# Patient Record
(demographics unavailable — no encounter records)

---

## 2024-11-06 NOTE — HISTORY OF PRESENT ILLNESS
[FreeTextEntry1] : S/P L Mx/SLN/TRAM (02)([place]): +DCIS, -0/6 LN L Stage 0 DCIS +FH Br Ca (Sister 52) BRCA (): - S/P Bilat Ax Sono Core Bx's (10/20/15): "Benign" S/P L Br Bx (10/23/02)([place]): +DCIS Started "Bioidentical" HRT creams (): Feeling better.  S/P Cardiac Ablation () for SVT > palp's recurred > on metoprolol > helping Son  after fall Mother  (80yo) > Lung Ca Father  (86yo) > med probs Had extensive w/u for RLQ abd pain > "-" Pt dx'ed w. elevated Hg/Arsenic/selenium > ? s/t old fillings > seeing Dentist > discussed.  to get tested also S/P Mho's for SCC sup pre-sternal and L Post Cx Colonoscopy (): "WNL" > 5yrs  PAP/Pelvic (): "WNL"  Had COVID () > recovered Got second Pfizer ()(RUE) No other MH/FH changes. ROS reviewed/discussed.  R Mammo/Sono (23): FG, +stable L 1 cm Ax LN, NSF

## 2024-11-06 NOTE — PHYSICAL EXAM
[Normocephalic] : normocephalic [Atraumatic] : atraumatic [Supple] : supple [No Supraclavicular Adenopathy] : no supraclavicular adenopathy [No Cervical Adenopathy] : no cervical adenopathy [No Thyromegaly] : no thyromegaly [Normal Sinus Rhythm] : normal sinus rhythm [Examined in the supine and seated position] : examined in the supine and seated position [No dominant masses] : no dominant masses in right breast  [No dominant masses] : no dominant masses left breast [No Nipple Retraction] : no left nipple retraction [No Nipple Discharge] : no left nipple discharge [No Axillary Lymphadenopathy] : no left axillary lymphadenopathy [No Edema] : no edema [No Rashes] : no rashes [No Ulceration] : no ulceration [de-identified] : +FC tissue NSF  [de-identified] : S/P SSMX/SLN/TRAM w/o rec. %. No lymphedema.

## 2025-06-12 NOTE — PHYSICAL EXAM
[NI] : Normal [de-identified] : shoulder  r side with scr and track suture marks with central 3 mm bump red and irritated  [de-identified] : as above

## 2025-06-12 NOTE — HISTORY OF PRESENT ILLNESS
[FreeTextEntry1] : AMALIA had scca removed from r shoulder and has a new lesion in the center of the scar AMALIA  has had uncomplicated recovery from surgery and anesthesia AMALIA was ssen this am by derm who was purportedly going to schedule moh's excision but decided to freeze the lesion with no biopsy done.  AMALIA is confused as to what to do The risks, benefits, alternatives, limitations and the permanent scars were outlined with the patient. Discussion of risks included but not limited to bleeding, infection, delayed healing, and anesthetic risk. AMALIA will allow healing of the site for a month and re evaluated the site  and we will consider excision if there is any residual lesion present 
Yes

## 2025-06-12 NOTE — ASSESSMENT
[FreeTextEntry1] : AMALIA  is going to leave the site for 4-8 weeks and then decide if she wants an excisional biopsy of the lesion All of AMALIA 's questions and concerns were addressed and answered completely  .brody is also interested in eyelid surgery facial surgery and toning of the arms with morpharnavs 8  we discussed 3 sessions of lisandra 8 to start

## 2025-06-18 NOTE — ASSESSMENT
[FreeTextEntry1] : AMALIA is prepared for bilateral arm treatment x 3 sessions with lisandra 8 with topical and injection anesthetic

## 2025-06-18 NOTE — HISTORY OF PRESENT ILLNESS
[FreeTextEntry1] : AMALIA is complaining of bilateral arm laxity and crepe skin AMALIA desires brachioplasty surgery The risks, benefits, alternatives, limitations and the permanent scars were outlined with the patient. Discussion of risks included but not limited to bleeding, infection, delayed healing, and anesthetic risk. All of AMALIA 's questions and concerns were addressed and answered completely  The instructions were reviewed in detail with AMALIA.  AMALIA has mild crepe skin with minimal lipodystrophy and will benefit from less invasive procedure than brachioplasty AMALIA is a candidate for morpheus 8 sessions x 3

## 2025-07-09 NOTE — ASSESSMENT
[FreeTextEntry1] : AMALIA tolerated the procedure well. The instructions were reviewed in detail with AMALIA.  All of AMALIA 's questions and concerns were addressed and answered completely  AMALIA will return to the office for a post procedure visit 1 month

## 2025-07-09 NOTE — PROCEDURE
[FreeTextEntry6] : AMALIA MAURICE  is a 66 year w woman  prepared for Morpheus 8 treatment to the arms 1/3. She was pre-treated with bupivacaine, tetracaine, and lidocaine cream for 1 hour prior to treatment . See separate sheet for fluence, depth and number of passes information. The risks, benefits, alternatives, limitations and the permanent scars were outlined with the patient. Discussion of risks included but not limited to bleeding, infection, delayed healing, and anesthetic risk. All of AMALIA questions and concerns were addressed and answered completely The instructions were reviewed in detail with AMALIA . AMALIA tolerated the procedure well. AMALIA has had uncomplicated recovery from Morpheus 8 procedure.